# Patient Record
Sex: MALE | Race: WHITE
[De-identification: names, ages, dates, MRNs, and addresses within clinical notes are randomized per-mention and may not be internally consistent; named-entity substitution may affect disease eponyms.]

---

## 2018-02-20 ENCOUNTER — HOSPITAL ENCOUNTER (EMERGENCY)
Dept: HOSPITAL 17 - PHEFT | Age: 58
Discharge: HOME | End: 2018-02-20
Payer: SELF-PAY

## 2018-02-20 VITALS
SYSTOLIC BLOOD PRESSURE: 118 MMHG | OXYGEN SATURATION: 98 % | RESPIRATION RATE: 18 BRPM | TEMPERATURE: 98.1 F | HEART RATE: 67 BPM | DIASTOLIC BLOOD PRESSURE: 68 MMHG

## 2018-02-20 VITALS — BODY MASS INDEX: 22.07 KG/M2 | WEIGHT: 157.63 LBS | HEIGHT: 71 IN

## 2018-02-20 DIAGNOSIS — R21: Primary | ICD-10-CM

## 2018-02-20 DIAGNOSIS — L29.9: ICD-10-CM

## 2018-02-20 DIAGNOSIS — F17.200: ICD-10-CM

## 2018-02-20 DIAGNOSIS — Z85.828: ICD-10-CM

## 2018-02-20 PROCEDURE — 99283 EMERGENCY DEPT VISIT LOW MDM: CPT

## 2018-02-20 PROCEDURE — 96372 THER/PROPH/DIAG INJ SC/IM: CPT

## 2018-02-20 NOTE — PD
HPI


Chief Complaint:  Skin Problem


Time Seen by Provider:  15:43


Travel History


International Travel<30 days:  No


Contact w/Intl Traveler<30days:  No


Traveled to known affect area:  No





History of Present Illness


HPI


Patient comes to the emergency department complaining of a pruritic rash 

ongoing since beginning of December.  Patient reports put hydrocortisone cream 

on it that seems to help with the itch.  Patient states the pruritus same day 

or night.  Patient believes he is having a reaction to laundry detergent.  

Patient denies any weight loss, fevers, chest pain, shortness of breath.  

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 

installing tiles.





PFSH


Past Medical History


Cancer:  Yes (skin)


Diminished Hearing:  No


Immunizations Current:  Yes


Tetanus Vaccination:  Unknown


Influenza Vaccination:  No





Past Surgical History


Other Surgery:  Yes (skin cancer)





Social History


Alcohol Use:  No


Tobacco Use:  Yes (1 pk)


Substance Use:  No





Allergies-Medications


(Allergen,Severity, Reaction):  


Coded Allergies:  


     No Known Allergies (Unverified , 2/20/18)


Reported Meds & Prescriptions





Reported Meds & Active Scripts


Active


Pepcid (Famotidine) 20 Mg Tab 20 Mg PO BID 10 Days


Prednisone (21) 10 mg tab Dose Pack (Prednisone) 10 Mg Pack 10 Mg PO AS DIRECTED








Review of Systems


Except as stated in HPI:  all other systems reviewed are Neg





Physical Exam


Narrative


GENERAL: Well-developed, well nourished, in no acute distress, and non-ill 

appearing.


SKIN: Focused skin assessment warm and dry.  Erythematous rash noted on 

bilateral upper extremities is nontender afebrile without crepitus.  There is 

mild excoriation noted.  No signs of secondary infection.  Rash is not 

consistent with scabies.


HEAD: Atraumatic. Normocephalic. 


EYES: Pupils equal and round. EOMI. No scleral icterus. No injection or 

drainage. 


ENT: No nasal bleeding or discharge.  Mucous membranes pink and moist.


NECK: Trachea midline. Supple.  No nuclear rigidity.


RESPIRATORY: No accessory muscle use.  No respiratory distress. 


MUSCULOSKELETAL: No obvious deformities. No clubbing.  No cyanosis.  No edema.  

Full range of motion.


NEUROLOGICAL: Awake and alert. No obvious cranial nerve deficits.  Motor 

grossly within normal limits. Normal speech.


PSYCHIATRIC: Appropriate mood and affect; insight and judgment normal.





Data


Data


Last Documented VS





Vital Signs








  Date Time  Temp Pulse Resp B/P (MAP) Pulse Ox O2 Delivery O2 Flow Rate FiO2


 


2/20/18 14:58 98.1 67 18 118/68 (85) 98   








Orders





 Orders


Dexamethasone Inj (Decadron Inj) (2/20/18 16:00)


Famotidine (Pepcid) (2/20/18 16:00)


Diphenhydramine (Benadryl) (2/20/18 16:00)


Ed Discharge Order (2/20/18 16:42)








[facility]


Medical Decision Making


Medical Screen Exam Complete:  Yes


Emergency Medical Condition:  Yes


Differential Diagnosis


Allergic reaction, dermatitis, scabies, cellulitis, folliculitis


Narrative Course


The patient presented with nonspecific rash/dermatitis. There were no blisters 

or bullae, target lesions, purpura or petechia, nor vesiculobullous or 

scarlatiniform lesions. The patient looks great and was non-ill appearing. 

There was no evidence to suggest scabies, cellulitis, folliculitis or abscess, 

Staph. Scalded Skin Syndrome, Toxic Shock, Toxic Epidermal necrolysis, Kawasaki

, Measles, Rubella, Erythema Multiforme (minor or major). Plan of care was 

discussed with the patient and the patient is to follow up with their physician 

and/or dermatologist. The patient agreed with plan.





Patient in no obvious distress upon re-evaluation.  Reports improvement of 

symptoms status post medication.  Patient was asked if they wanted to speak to 

my attending, which the patient did not wish to do at this time.  Any questions/

concerns in reference to patient diagnosis/condition discussed and clarified 

prior to patient's discharge. Reinforced sheer importance of close follow up 

with patient's primary physician or primary care clinic and/or dermatologist. 

Instructed patient to return to ED immediately, if symptoms return/worsen. 

Patient showed understanding of above instructions.  Further instructions and 

recommendations were detailed in discharge paperwork.  Patient ambulated 

without difficulty out of ED at discharge.





Diagnosis





 Primary Impression:  


 Rash


Referrals:  


[facility]


Patient Instructions:  Acute Rash (ED), General Instructions





***Additional Instructions:  


Follow-up with your primary care physician and/or dermatologist for 

reevaluation and possible evaluation of other underlying issues such as 

autoimmune disorder or cancer.  Take all medication as prescribed.  Use over-the

-counter Claritin or Zyrtec or Benadryl as needed for symptomatic relief.  

Follow instructions on the packaging.  Return to the emergency department if 

symptoms get worse.


***Med/Other Pt SpecificInfo:  Prescription(s) given


Scripts


Famotidine (Pepcid) 20 Mg Tab


20 MG PO BID for 10 Days, #20 TAB 0 Refills


   Prov: Meño Wilder MD         2/20/18 


Prednisone (21) 10 mg tab Dose Pack (Prednisone (21) 10 mg tab Dose Pack) 10 Mg 

Pack


10 MG PO AS DIRECTED for Inflammation, #1 DSPK 0 Refills


   Prov: Meño Wilder MD         2/20/18


Disposition:  01 DISCHARGE HOME


Condition:  Stable











Torres Mendez Feb 20, 2018 15:49